# Patient Record
Sex: FEMALE | Race: WHITE | HISPANIC OR LATINO
[De-identification: names, ages, dates, MRNs, and addresses within clinical notes are randomized per-mention and may not be internally consistent; named-entity substitution may affect disease eponyms.]

---

## 2017-06-29 ENCOUNTER — TRANSCRIPTION ENCOUNTER (OUTPATIENT)
Age: 47
End: 2017-06-29

## 2017-09-12 ENCOUNTER — APPOINTMENT (OUTPATIENT)
Dept: HUMAN REPRODUCTION | Facility: CLINIC | Age: 47
End: 2017-09-12
Payer: COMMERCIAL

## 2017-09-12 PROCEDURE — 99215 OFFICE O/P EST HI 40 MIN: CPT

## 2017-09-12 PROCEDURE — 36415 COLL VENOUS BLD VENIPUNCTURE: CPT

## 2017-10-17 ENCOUNTER — APPOINTMENT (OUTPATIENT)
Dept: HUMAN REPRODUCTION | Facility: CLINIC | Age: 47
End: 2017-10-17
Payer: COMMERCIAL

## 2017-10-17 PROCEDURE — 99215 OFFICE O/P EST HI 40 MIN: CPT

## 2017-10-17 PROCEDURE — 36415 COLL VENOUS BLD VENIPUNCTURE: CPT

## 2017-11-14 VITALS — HEIGHT: 63 IN | WEIGHT: 152 LBS | BODY MASS INDEX: 26.93 KG/M2

## 2017-11-14 DIAGNOSIS — Z82.49 FAMILY HISTORY OF ISCHEMIC HEART DISEASE AND OTHER DISEASES OF THE CIRCULATORY SYSTEM: ICD-10-CM

## 2017-11-21 ENCOUNTER — APPOINTMENT (OUTPATIENT)
Dept: ANTEPARTUM | Facility: CLINIC | Age: 47
End: 2017-11-21

## 2017-11-21 ENCOUNTER — APPOINTMENT (OUTPATIENT)
Dept: MATERNAL FETAL MEDICINE | Facility: CLINIC | Age: 47
End: 2017-11-21
Payer: COMMERCIAL

## 2017-11-21 ENCOUNTER — ASOB RESULT (OUTPATIENT)
Age: 47
End: 2017-11-21

## 2017-11-21 VITALS
DIASTOLIC BLOOD PRESSURE: 68 MMHG | BODY MASS INDEX: 27.11 KG/M2 | SYSTOLIC BLOOD PRESSURE: 90 MMHG | WEIGHT: 153 LBS | HEIGHT: 63 IN

## 2017-11-21 VITALS — OXYGEN SATURATION: 99 % | HEART RATE: 76 BPM

## 2017-11-21 LAB
BILIRUB UR QL STRIP: NEGATIVE
COLLECTION METHOD: NORMAL
GLUCOSE UR-MCNC: NEGATIVE
HCG UR QL: 0.2 EU/DL
HGB UR QL STRIP.AUTO: NEGATIVE
KETONES UR-MCNC: NEGATIVE
LEUKOCYTE ESTERASE UR QL STRIP: NORMAL
NITRITE UR QL STRIP: NEGATIVE
PH UR STRIP: 5
PROT UR STRIP-MCNC: NEGATIVE
SP GR UR STRIP: 1.01

## 2017-11-21 PROCEDURE — 99243 OFF/OP CNSLTJ NEW/EST LOW 30: CPT

## 2018-02-15 ENCOUNTER — TRANSCRIPTION ENCOUNTER (OUTPATIENT)
Age: 48
End: 2018-02-15

## 2018-02-17 ENCOUNTER — TRANSCRIPTION ENCOUNTER (OUTPATIENT)
Age: 48
End: 2018-02-17

## 2018-10-23 ENCOUNTER — TRANSCRIPTION ENCOUNTER (OUTPATIENT)
Age: 48
End: 2018-10-23

## 2018-12-18 ENCOUNTER — TRANSCRIPTION ENCOUNTER (OUTPATIENT)
Age: 48
End: 2018-12-18

## 2019-01-17 ENCOUNTER — TRANSCRIPTION ENCOUNTER (OUTPATIENT)
Age: 49
End: 2019-01-17

## 2019-11-27 ENCOUNTER — APPOINTMENT (OUTPATIENT)
Dept: CT IMAGING | Facility: CLINIC | Age: 49
End: 2019-11-27

## 2021-01-22 ENCOUNTER — TRANSCRIPTION ENCOUNTER (OUTPATIENT)
Age: 51
End: 2021-01-22

## 2021-03-29 ENCOUNTER — TRANSCRIPTION ENCOUNTER (OUTPATIENT)
Age: 51
End: 2021-03-29

## 2021-08-06 ENCOUNTER — TRANSCRIPTION ENCOUNTER (OUTPATIENT)
Age: 51
End: 2021-08-06

## 2021-10-22 ENCOUNTER — TRANSCRIPTION ENCOUNTER (OUTPATIENT)
Age: 51
End: 2021-10-22

## 2021-10-29 ENCOUNTER — TRANSCRIPTION ENCOUNTER (OUTPATIENT)
Age: 51
End: 2021-10-29

## 2022-03-13 ENCOUNTER — TRANSCRIPTION ENCOUNTER (OUTPATIENT)
Age: 52
End: 2022-03-13

## 2022-04-10 ENCOUNTER — TRANSCRIPTION ENCOUNTER (OUTPATIENT)
Age: 52
End: 2022-04-10

## 2022-04-17 ENCOUNTER — TRANSCRIPTION ENCOUNTER (OUTPATIENT)
Age: 52
End: 2022-04-17

## 2022-04-21 ENCOUNTER — TRANSCRIPTION ENCOUNTER (OUTPATIENT)
Age: 52
End: 2022-04-21

## 2022-04-27 ENCOUNTER — TRANSCRIPTION ENCOUNTER (OUTPATIENT)
Age: 52
End: 2022-04-27

## 2022-05-07 ENCOUNTER — NON-APPOINTMENT (OUTPATIENT)
Age: 52
End: 2022-05-07

## 2022-07-14 ENCOUNTER — APPOINTMENT (OUTPATIENT)
Dept: GASTROENTEROLOGY | Facility: CLINIC | Age: 52
End: 2022-07-14

## 2022-07-14 VITALS
BODY MASS INDEX: 28.7 KG/M2 | RESPIRATION RATE: 18 BRPM | DIASTOLIC BLOOD PRESSURE: 58 MMHG | OXYGEN SATURATION: 99 % | HEART RATE: 85 BPM | TEMPERATURE: 97.5 F | HEIGHT: 63.5 IN | WEIGHT: 164 LBS | SYSTOLIC BLOOD PRESSURE: 102 MMHG

## 2022-07-14 DIAGNOSIS — Z12.11 ENCOUNTER FOR SCREENING FOR MALIGNANT NEOPLASM OF COLON: ICD-10-CM

## 2022-07-14 DIAGNOSIS — K59.00 CONSTIPATION, UNSPECIFIED: ICD-10-CM

## 2022-07-14 DIAGNOSIS — K21.9 GASTRO-ESOPHAGEAL REFLUX DISEASE W/OUT ESOPHAGITIS: ICD-10-CM

## 2022-07-14 PROCEDURE — 99204 OFFICE O/P NEW MOD 45 MIN: CPT

## 2022-07-14 RX ORDER — POLYETHYLENE GLYCOL 3350 17 G/17G
17 POWDER, FOR SOLUTION ORAL DAILY
Qty: 30 | Refills: 3 | Status: ACTIVE | COMMUNITY
Start: 2022-07-14 | End: 1900-01-01

## 2022-07-14 NOTE — HISTORY OF PRESENT ILLNESS
[FreeTextEntry1] : 52 yo female with c/o abdominal pain luq  and epigastric pain ,now resolved with bloating, labs done by pcp pr patient normal, patient on probiotics improved .  ct scan recommended not done.Sill with c/o bloating, irregular bms, patietn reports 4  or 5bms a week hard stool. improved with probiotics. no brbpr, no melena.no weight loss.\par \par patient undergoing IVF\par no h/o colon or gastric cancer\par h/o gallstones\par \par h/o egd over 10 years\par no h/o colonoscopy

## 2022-07-14 NOTE — REVIEW OF SYSTEMS
[As Noted in HPI] : as noted in HPI [Fever] : no fever [Chills] : no chills [Eyesight Problems] : no eyesight problems [Discharge From Eyes] : no purulent discharge from the eyes [Nosebleeds] : no nosebleeds [Nasal Discharge] : no nasal discharge [Chest Pain] : no chest pain [Cough] : no cough [SOB on Exertion] : no shortness of breath during exertion [Pelvic Pain] : no pelvic pain [Dysmenorrhea] : no dysmenorrhea [Itching] : no itching [Change In A Mole] : no change in a mole [Dizziness] : no dizziness [Fainting] : no fainting [Anxiety] : no anxiety [Depression] : no depression [Muscle Weakness] : no muscle weakness [Swollen Glands] : no swollen glands [Swollen Glands In The Neck] : no swollen glands in the neck

## 2022-07-14 NOTE — PHYSICAL EXAM
[General Appearance - Alert] : alert [General Appearance - In No Acute Distress] : in no acute distress [Sclera] : the sclera and conjunctiva were normal [Hearing Threshold Finger Rub Not [place]] : hearing was normal [] : no respiratory distress [Auscultation Breath Sounds / Voice Sounds] : lungs were clear to auscultation bilaterally [Heart Sounds] : normal S1 and S2 [Bowel Sounds] : normal bowel sounds [Abdomen Soft] : soft [Abdomen Tenderness] : non-tender [No CVA Tenderness] : no ~M costovertebral angle tenderness [Abnormal Walk] : normal gait [No Focal Deficits] : no focal deficits [Oriented To Time, Place, And Person] : oriented to person, place, and time

## 2022-07-14 NOTE — ASSESSMENT
[FreeTextEntry1] : 1. midepigastric pain, now resolved, ddx gerd,gastriitis, pud, biliary disease\par \par plan acid suppression prn\par        egd to evaluate\par    abdominal us r/o gallstones\par     labs requested ,pt to bring in\par \par 2. chronic constipation\par \par plan miralax daily\par \par 3. average risk for colon cancer recommend colonoscopy for screening\par \par Discussed risks including but not limited to bleeding,infection,drug reaction, perforation,missed lesion,benefits and alternatives of colonoscopy/egd with patient  including no\par treatment and patient consents to procedure.\par \par plan colonoscopy to be scheduled.\par \par

## 2022-07-16 ENCOUNTER — NON-APPOINTMENT (OUTPATIENT)
Age: 52
End: 2022-07-16

## 2022-07-22 DIAGNOSIS — Z00.00 ENCOUNTER FOR GENERAL ADULT MEDICAL EXAMINATION W/OUT ABNORMAL FINDINGS: ICD-10-CM

## 2022-08-06 ENCOUNTER — OUTPATIENT (OUTPATIENT)
Dept: OUTPATIENT SERVICES | Facility: HOSPITAL | Age: 52
LOS: 1 days | End: 2022-08-06
Payer: COMMERCIAL

## 2022-08-06 ENCOUNTER — APPOINTMENT (OUTPATIENT)
Dept: ULTRASOUND IMAGING | Facility: CLINIC | Age: 52
End: 2022-08-06

## 2022-08-06 DIAGNOSIS — Z00.8 ENCOUNTER FOR OTHER GENERAL EXAMINATION: ICD-10-CM

## 2022-08-06 DIAGNOSIS — R10.9 UNSPECIFIED ABDOMINAL PAIN: ICD-10-CM

## 2022-08-06 PROCEDURE — 76700 US EXAM ABDOM COMPLETE: CPT | Mod: 26

## 2022-08-06 PROCEDURE — 76700 US EXAM ABDOM COMPLETE: CPT

## 2022-08-11 ENCOUNTER — NON-APPOINTMENT (OUTPATIENT)
Age: 52
End: 2022-08-11

## 2022-08-16 ENCOUNTER — NON-APPOINTMENT (OUTPATIENT)
Age: 52
End: 2022-08-16

## 2022-08-16 LAB — SARS-COV-2 N GENE NPH QL NAA+PROBE: NOT DETECTED

## 2022-08-19 ENCOUNTER — APPOINTMENT (OUTPATIENT)
Dept: GASTROENTEROLOGY | Facility: CLINIC | Age: 52
End: 2022-08-19

## 2022-08-19 ENCOUNTER — LABORATORY RESULT (OUTPATIENT)
Age: 52
End: 2022-08-19

## 2022-08-19 PROCEDURE — 43239 EGD BIOPSY SINGLE/MULTIPLE: CPT

## 2022-08-19 PROCEDURE — 45378 DIAGNOSTIC COLONOSCOPY: CPT | Mod: 33

## 2022-08-30 RX ORDER — AMOXICILLIN 500 MG/1
500 TABLET, FILM COATED ORAL
Qty: 56 | Refills: 0 | Status: ACTIVE | COMMUNITY
Start: 2022-08-30 | End: 1900-01-01

## 2022-08-30 RX ORDER — CLARITHROMYCIN 500 MG/1
500 TABLET, FILM COATED ORAL
Qty: 28 | Refills: 0 | Status: ACTIVE | COMMUNITY
Start: 2022-08-30 | End: 1900-01-01

## 2022-08-30 RX ORDER — OMEPRAZOLE 20 MG/1
20 CAPSULE, DELAYED RELEASE ORAL TWICE DAILY
Qty: 28 | Refills: 2 | Status: ACTIVE | COMMUNITY
Start: 2022-08-30 | End: 1900-01-01

## 2022-09-22 ENCOUNTER — APPOINTMENT (OUTPATIENT)
Dept: ORTHOPEDIC SURGERY | Facility: CLINIC | Age: 52
End: 2022-09-22

## 2022-09-22 VITALS — BODY MASS INDEX: 29.06 KG/M2 | HEIGHT: 63 IN | WEIGHT: 164 LBS

## 2022-09-22 PROCEDURE — 99204 OFFICE O/P NEW MOD 45 MIN: CPT

## 2022-09-22 PROCEDURE — 99214 OFFICE O/P EST MOD 30 MIN: CPT

## 2022-09-28 NOTE — HISTORY OF PRESENT ILLNESS
[Neck] : neck [Lower back] : lower back [10] : 10 [8] : 8 [Dull/Aching] : dull/aching [Radiating] : radiating [Tightness] : tightness [Constant] : constant [Nothing helps with pain getting better] : Nothing helps with pain getting better [Sitting] : sitting [Standing] : standing [Bending forward] : bending forward [Full time] : Work status: full time [de-identified] : Pt. is a 51 year old female who presents for evaluation of her cervical and lumbar spine. She reports a long standing\par history of cervical and lumbar spine issues. PT in the past (over a year ago) has been very beneficial. Tylenol and Advil\par without relief. She has tried topical medication without relief. There is intermittent numbness/tingling in toes of both\par feet. No issues controlling bowel/bladder. Pain has been waking her at night. [] : no [FreeTextEntry7] : neck / left arm/ left leg

## 2022-09-28 NOTE — ASSESSMENT
[FreeTextEntry1] : Pt. responded very well to a course of PT in the past with virtual resolution in the past. She has been out of therapy\par for over a year and symptoms have returned. She has tried a home exercise program which is not offering the same\par relief. Discussed a return to therapy and patient eager to restart, rx provided. Discussed can always get advanced imaging if no improvement with PT.\par fu if no improvement with PT

## 2022-09-30 ENCOUNTER — NON-APPOINTMENT (OUTPATIENT)
Age: 52
End: 2022-09-30

## 2023-02-15 ENCOUNTER — APPOINTMENT (OUTPATIENT)
Dept: ORTHOPEDIC SURGERY | Facility: CLINIC | Age: 53
End: 2023-02-15

## 2023-02-18 ENCOUNTER — NON-APPOINTMENT (OUTPATIENT)
Age: 53
End: 2023-02-18

## 2023-02-21 ENCOUNTER — APPOINTMENT (OUTPATIENT)
Dept: ORTHOPEDIC SURGERY | Facility: CLINIC | Age: 53
End: 2023-02-21
Payer: COMMERCIAL

## 2023-02-21 VITALS — HEIGHT: 63 IN | BODY MASS INDEX: 29.06 KG/M2 | WEIGHT: 164 LBS

## 2023-02-21 PROCEDURE — 99213 OFFICE O/P EST LOW 20 MIN: CPT

## 2023-02-23 NOTE — IMAGING
[de-identified] : motor 5/5\par sensation I LT\par - SLR\par lumbar no spasm\par lumbar nontender\par \par c spine: nontender, no spasm\par no deformity\par motor UE 5/5\par  sensation I LT\par - [place]

## 2023-02-23 NOTE — ASSESSMENT
[FreeTextEntry1] : has responded well to PT and acupuncture in the past;  wishes to continue along those lines;  no new symptoms to investigate;  sooner she can transition to a HEP the better;  will consider repeat advanced imaging if she cannot transition to HEP after a few months of PT

## 2023-02-23 NOTE — HISTORY OF PRESENT ILLNESS
[Neck] : neck [Lower back] : lower back [10] : 10 [8] : 8 [Dull/Aching] : dull/aching [Radiating] : radiating [Tightness] : tightness [Constant] : constant [Nothing helps with pain getting better] : Nothing helps with pain getting better [Sitting] : sitting [Standing] : standing [Bending forward] : bending forward [Full time] : Work status: full time [de-identified] : less pain with PT and HEP;  no new symptoms; \par acupuncture also continues to help mitigate symptoms; [] : no [FreeTextEntry5] : Pt here for follow up of neck and low back pain. Around 02/02/2023; started having severe neck and left arm pain; elbow was throbbing. Attended PT & acupuncture for neck- helped greatly. PT has been helping low back. Needs new PT script and acupuncture script today.  [FreeTextEntry7] : neck / left arm/ left leg

## 2023-04-03 ENCOUNTER — APPOINTMENT (OUTPATIENT)
Dept: ORTHOPEDIC SURGERY | Facility: CLINIC | Age: 53
End: 2023-04-03

## 2023-04-12 ENCOUNTER — NON-APPOINTMENT (OUTPATIENT)
Age: 53
End: 2023-04-12

## 2023-04-17 ENCOUNTER — APPOINTMENT (OUTPATIENT)
Dept: GASTROENTEROLOGY | Facility: CLINIC | Age: 53
End: 2023-04-17

## 2023-05-01 ENCOUNTER — APPOINTMENT (OUTPATIENT)
Dept: ORTHOPEDIC SURGERY | Facility: CLINIC | Age: 53
End: 2023-05-01
Payer: COMMERCIAL

## 2023-05-01 VITALS — BODY MASS INDEX: 29.06 KG/M2 | WEIGHT: 164 LBS | HEIGHT: 63 IN

## 2023-05-01 DIAGNOSIS — M77.12 LATERAL EPICONDYLITIS, LEFT ELBOW: ICD-10-CM

## 2023-05-01 PROCEDURE — 99213 OFFICE O/P EST LOW 20 MIN: CPT

## 2023-05-01 NOTE — HISTORY OF PRESENT ILLNESS
[Neck] : neck [Lower back] : lower back [8] : 8 [Dull/Aching] : dull/aching [Radiating] : radiating [Tightness] : tightness [Constant] : constant [Nothing helps with pain getting better] : Nothing helps with pain getting better [Sitting] : sitting [Standing] : standing [Bending forward] : bending forward [Full time] : Work status: full time [de-identified] : 5/1/23: Here to f/up with c&l. Has been attending PT which has improved pain, still remains some radicular components, shooting pain through arm.  [] : no [FreeTextEntry5] : Pt here for follow up of neck and low back pain. Doing well with PT & acupuncture. Neck feels worse today.  Left leg pain has subsided.  Needs new PT script and acupuncture script today.  [FreeTextEntry7] :  left arm

## 2023-05-25 ENCOUNTER — LABORATORY RESULT (OUTPATIENT)
Age: 53
End: 2023-05-25

## 2023-05-25 ENCOUNTER — APPOINTMENT (OUTPATIENT)
Dept: GASTROENTEROLOGY | Facility: CLINIC | Age: 53
End: 2023-05-25
Payer: COMMERCIAL

## 2023-05-25 VITALS
HEIGHT: 63 IN | BODY MASS INDEX: 30.48 KG/M2 | SYSTOLIC BLOOD PRESSURE: 106 MMHG | OXYGEN SATURATION: 99 % | HEART RATE: 75 BPM | WEIGHT: 172 LBS | DIASTOLIC BLOOD PRESSURE: 70 MMHG

## 2023-05-25 PROCEDURE — 99215 OFFICE O/P EST HI 40 MIN: CPT

## 2023-05-25 NOTE — PHYSICAL EXAM
[None] : no edema [Normal] : normal bowel sounds, non-tender, no masses, soft, no no hepato-splenomegaly [Bowel Sounds] : normal bowel sounds [Abdomen Tenderness] : non-tender [No CVA Tenderness] : no CVA  tenderness [Abnormal Walk] : normal gait [] : no rash [No Focal Deficits] : no focal deficits [Oriented To Time, Place, And Person] : oriented to person, place, and time

## 2023-05-25 NOTE — REVIEW OF SYSTEMS
[As Noted in HPI] : as noted in HPI [Fever] : no fever [Red Eyes] : eyes not red [Sore Throat] : no sore throat [Chest Pain] : no chest pain [Testicular Pain] : no testicular pain [Rash] : no rash [Skin Wound] : no skin wound [Dizziness] : no dizziness [Fainting] : no fainting [Depression] : no depression [Muscle Weakness] : no muscle weakness [Easy Bruising] : no tendency for easy bruising

## 2023-05-25 NOTE — ASSESSMENT
[FreeTextEntry1] : 1. epigastric pain worse with certain food, probable gerd related\par \par plan;ppi as needed\par \par 2. fatty liver on us\par \par plan fibroscan\par weight management referral\par \par 3. h/o h.pylori gastritis\par \par plan h.pylori breath test\par \par 4gas/bloating\par \par plan low fodmap diet\par probiotics

## 2023-05-25 NOTE — HISTORY OF PRESENT ILLNESS
[FreeTextEntry1] : 53 yo female withc/o epigastric pain, chronic since last year, abdominal us, fatty liver. patient takes mylanta as needed. food triggers pain, not weekly. weight gain, no n/v. no gerd.  recent constipation, no brbpr,\par \par s/p egd/colonoscopy mild gastritis, h.pylori positive 2022

## 2023-05-31 LAB
ENDOMYSIUM IGA SER QL: NEGATIVE
ENDOMYSIUM IGA TITR SER: NORMAL
GLIADIN IGA SER QL: 6.4 UNITS
GLIADIN IGG SER QL: <5 UNITS
GLIADIN PEPTIDE IGA SER-ACNC: NEGATIVE
GLIADIN PEPTIDE IGG SER-ACNC: NEGATIVE
IGA SER QL IEP: 197 MG/DL
TTG IGA SER IA-ACNC: 2.4 U/ML
TTG IGA SER-ACNC: NEGATIVE
TTG IGG SER IA-ACNC: <1.2 U/ML
TTG IGG SER IA-ACNC: NEGATIVE
UREA BREATH TEST QL: POSITIVE

## 2023-05-31 RX ORDER — OMEPRAZOLE 20 MG/1
20 CAPSULE, DELAYED RELEASE ORAL TWICE DAILY
Qty: 20 | Refills: 0 | Status: ACTIVE | COMMUNITY
Start: 2023-05-31 | End: 1900-01-01

## 2023-05-31 RX ORDER — TETRACYCLINE HYDROCHLORIDE 500 MG/1
500 CAPSULE ORAL EVERY 6 HOURS
Qty: 40 | Refills: 0 | Status: ACTIVE | COMMUNITY
Start: 2023-05-31 | End: 1900-01-01

## 2023-05-31 RX ORDER — METRONIDAZOLE 250 MG/1
250 TABLET ORAL EVERY 6 HOURS
Qty: 40 | Refills: 0 | Status: ACTIVE | COMMUNITY
Start: 2023-05-31 | End: 1900-01-01

## 2023-05-31 RX ORDER — BISMUTH SUBSALICYLATE 262 MG/1
262 TABLET, CHEWABLE ORAL 4 TIMES DAILY
Qty: 80 | Refills: 0 | Status: ACTIVE | COMMUNITY
Start: 2023-05-31 | End: 1900-01-01

## 2023-06-05 ENCOUNTER — APPOINTMENT (OUTPATIENT)
Dept: ORTHOPEDIC SURGERY | Facility: CLINIC | Age: 53
End: 2023-06-05
Payer: COMMERCIAL

## 2023-06-05 VITALS — HEIGHT: 63 IN | BODY MASS INDEX: 30.48 KG/M2 | WEIGHT: 172 LBS

## 2023-06-05 DIAGNOSIS — M54.12 RADICULOPATHY, CERVICAL REGION: ICD-10-CM

## 2023-06-05 DIAGNOSIS — M54.16 RADICULOPATHY, LUMBAR REGION: ICD-10-CM

## 2023-06-05 PROCEDURE — 99213 OFFICE O/P EST LOW 20 MIN: CPT

## 2023-06-05 NOTE — ASSESSMENT
[FreeTextEntry1] : Would benefit from the continuation of PT and acupuncture as it seems she has setbacks when she is not attending. NSAIDs as needed. Can consider MRI if symptoms worsen or progress. f/u 6 weeks \par \par Patient seen by "Susan ROSE" under the supervision of "Dr. Cleaning"\par \par

## 2023-06-05 NOTE — HISTORY OF PRESENT ILLNESS
[Neck] : neck [Lower back] : lower back [8] : 8 [Dull/Aching] : dull/aching [Radiating] : radiating [Tightness] : tightness [Constant] : constant [Nothing helps with pain getting better] : Nothing helps with pain getting better [Sitting] : sitting [Standing] : standing [Bending forward] : bending forward [Full time] : Work status: full time [de-identified] : Here for fu on the neck and the back; notes worsening radicular complains into the left arm and left leg when she it not in PT and acupuncture. No new changes in weakness or bladder/bowel incontinence. She takes advil as needed.  [] : no [FreeTextEntry5] : Pt here for follow up of neck and low back pain. PT has been helping; didn't attend in 1 week; left leg became weak. Needs new PT script. [FreeTextEntry7] :  left arm

## 2023-07-10 ENCOUNTER — APPOINTMENT (OUTPATIENT)
Dept: HEPATOLOGY | Facility: CLINIC | Age: 53
End: 2023-07-10
Payer: COMMERCIAL

## 2023-07-10 DIAGNOSIS — K29.70 GASTRITIS, UNSPECIFIED, W/OUT BLEEDING: ICD-10-CM

## 2023-07-10 DIAGNOSIS — R10.9 UNSPECIFIED ABDOMINAL PAIN: ICD-10-CM

## 2023-07-10 DIAGNOSIS — K76.0 FATTY (CHANGE OF) LIVER, NOT ELSEWHERE CLASSIFIED: ICD-10-CM

## 2023-07-10 DIAGNOSIS — B96.81 GASTRITIS, UNSPECIFIED, W/OUT BLEEDING: ICD-10-CM

## 2023-07-10 PROCEDURE — 76981 USE PARENCHYMA: CPT

## 2023-07-14 PROBLEM — K76.0 FATTY LIVER: Status: ACTIVE | Noted: 2023-05-25

## 2023-07-14 PROBLEM — K29.70 GASTRITIS, HELICOBACTER PYLORI: Status: ACTIVE | Noted: 2022-08-26

## 2023-07-14 PROBLEM — R10.9 ABDOMINAL PAIN: Status: ACTIVE | Noted: 2022-07-14

## 2023-10-23 ENCOUNTER — APPOINTMENT (OUTPATIENT)
Dept: OBGYN | Facility: CLINIC | Age: 53
End: 2023-10-23
Payer: COMMERCIAL

## 2023-10-23 PROCEDURE — 99396 PREV VISIT EST AGE 40-64: CPT

## 2023-11-02 ENCOUNTER — APPOINTMENT (OUTPATIENT)
Dept: OBGYN | Facility: CLINIC | Age: 53
End: 2023-11-02
Payer: COMMERCIAL

## 2023-11-02 PROCEDURE — 76830 TRANSVAGINAL US NON-OB: CPT

## 2023-12-10 ENCOUNTER — NON-APPOINTMENT (OUTPATIENT)
Age: 53
End: 2023-12-10

## 2024-02-12 ENCOUNTER — APPOINTMENT (OUTPATIENT)
Dept: ORTHOPEDIC SURGERY | Facility: CLINIC | Age: 54
End: 2024-02-12
Payer: COMMERCIAL

## 2024-02-12 DIAGNOSIS — M54.2 CERVICALGIA: ICD-10-CM

## 2024-02-12 PROCEDURE — 99213 OFFICE O/P EST LOW 20 MIN: CPT

## 2024-02-12 NOTE — HISTORY OF PRESENT ILLNESS
[Neck] : neck [Lower back] : lower back [8] : 8 [Dull/Aching] : dull/aching [Radiating] : radiating [Tightness] : tightness [Constant] : constant [Nothing helps with pain getting better] : Nothing helps with pain getting better [Sitting] : sitting [Standing] : standing [Bending forward] : bending forward [Full time] : Work status: full time [de-identified] : Here for fu on the neck and the back; notes worsening radicular complains into the LUE with n/t and headaches. Has been doing PT over the last few weeks which helps temporarily.  [] : no [FreeTextEntry7] :  left arm [FreeTextEntry5] : Pt here for follow up of neck and low back pain. PT has been helping; didn't attend in 1 week; left leg became weak. Needs new PT script.

## 2024-02-12 NOTE — IMAGING
[de-identified] : L cervical paraspinal muscle tenderness L cervical paraspinal muscle spasm L trapezius muscle tenderness L trapezius muscle spasm  diminished ROM in all planes  motor exam 5/5 strength bilaterally sensory intact negative hoffmans negative spurlings

## 2024-02-12 NOTE — ASSESSMENT
[FreeTextEntry1] : 52 yo F with hx of cervical HNP p/w recurrent neck and LUE radicular pain, severe. Has been doing PT/HEP and meds without much relief. Has been many years since MRI CS. Will get updated MRI CS at this time. Continue PT. f/u after MRI to review.  Patient seen by Rachael Bangura PA-C under the supervision of Corbin Cleaning MD

## 2024-03-02 ENCOUNTER — APPOINTMENT (OUTPATIENT)
Dept: MRI IMAGING | Facility: CLINIC | Age: 54
End: 2024-03-02
Payer: COMMERCIAL

## 2024-03-02 PROCEDURE — 72141 MRI NECK SPINE W/O DYE: CPT

## 2024-03-11 ENCOUNTER — APPOINTMENT (OUTPATIENT)
Dept: ORTHOPEDIC SURGERY | Facility: CLINIC | Age: 54
End: 2024-03-11

## 2024-04-29 ENCOUNTER — APPOINTMENT (OUTPATIENT)
Dept: INTERNAL MEDICINE | Facility: CLINIC | Age: 54
End: 2024-04-29

## 2024-05-06 ENCOUNTER — APPOINTMENT (OUTPATIENT)
Dept: OBGYN | Facility: CLINIC | Age: 54
End: 2024-05-06

## 2024-06-24 ENCOUNTER — APPOINTMENT (OUTPATIENT)
Dept: OBGYN | Facility: CLINIC | Age: 54
End: 2024-06-24
Payer: COMMERCIAL

## 2024-06-24 PROCEDURE — 36415 COLL VENOUS BLD VENIPUNCTURE: CPT

## 2024-06-24 PROCEDURE — 99213 OFFICE O/P EST LOW 20 MIN: CPT

## 2024-07-25 ENCOUNTER — APPOINTMENT (OUTPATIENT)
Dept: ORTHOPEDIC SURGERY | Facility: CLINIC | Age: 54
End: 2024-07-25

## 2024-09-05 ENCOUNTER — APPOINTMENT (OUTPATIENT)
Dept: ORTHOPEDIC SURGERY | Facility: CLINIC | Age: 54
End: 2024-09-05

## 2024-09-09 ENCOUNTER — APPOINTMENT (OUTPATIENT)
Dept: GASTROENTEROLOGY | Facility: CLINIC | Age: 54
End: 2024-09-09
Payer: COMMERCIAL

## 2024-09-09 VITALS
WEIGHT: 174 LBS | HEART RATE: 88 BPM | OXYGEN SATURATION: 98 % | HEIGHT: 63 IN | SYSTOLIC BLOOD PRESSURE: 137 MMHG | DIASTOLIC BLOOD PRESSURE: 83 MMHG | BODY MASS INDEX: 30.83 KG/M2

## 2024-09-09 DIAGNOSIS — R14.0 ABDOMINAL DISTENSION (GASEOUS): ICD-10-CM

## 2024-09-09 PROCEDURE — 99214 OFFICE O/P EST MOD 30 MIN: CPT

## 2024-09-09 RX ORDER — OMEPRAZOLE 40 MG/1
40 CAPSULE, DELAYED RELEASE ORAL
Qty: 30 | Refills: 5 | Status: ACTIVE | COMMUNITY
Start: 2024-09-09 | End: 1900-01-01

## 2024-09-09 RX ORDER — LINACLOTIDE 145 UG/1
145 CAPSULE, GELATIN COATED ORAL
Qty: 30 | Refills: 3 | Status: ACTIVE | COMMUNITY
Start: 2024-09-09 | End: 1900-01-01

## 2024-09-09 NOTE — REVIEW OF SYSTEMS
[As Noted in HPI] : as noted in HPI [Fever] : no fever [Chills] : no chills [Red Eyes] : eyes not red [Sore Throat] : no sore throat [Chest Pain] : no chest pain [Palpitations] : no palpitations [SOB on Exertion] : no shortness of breath during exertion [Rash] : no rash [Joint Stiffness] : no joint stiffness [Skin Wound] : no skin wound [Fainting] : no fainting [Anxiety] : no anxiety [Muscle Weakness] : no muscle weakness [Easy Bruising] : no tendency for easy bruising

## 2024-09-09 NOTE — PHYSICAL EXAM
[Alert] : alert [Healthy Appearing] : healthy appearing [Sclera] : the sclera and conjunctiva were normal [Normal Appearance] : the appearance of the neck was normal [No Respiratory Distress] : no respiratory distress [Auscultation Breath Sounds / Voice Sounds] : lungs were clear to auscultation bilaterally [Normal S1, S2] : normal S1 and S2 [Bowel Sounds] : normal bowel sounds [Abdomen Tenderness] : non-tender [Abdomen Soft] : soft [No CVA Tenderness] : no CVA  tenderness [Abnormal Walk] : normal gait [No Clubbing, Cyanosis] : no clubbing or cyanosis of the fingernails [] : no rash [No Focal Deficits] : no focal deficits [Oriented To Time, Place, And Person] : oriented to person, place, and time [de-identified] : mild epigastric tenderness

## 2024-09-09 NOTE — HISTORY OF PRESENT ILLNESS
[FreeTextEntry1] : 53 yo female with h/o fatty liver and patient wtih c/o abdominal bloating, postive h.pylori 2022. treated with antibiotics. patient takes ppi as needed. helps the GERD and bloating. normal bms, no brbpr, no melena  s/p egd/colonoscopy in 2022 hpylori gastritis,  repeat colonoscopy in 5 years

## 2024-09-09 NOTE — ASSESSMENT
[FreeTextEntry1] : 1. bloating , constipation, gerd  plan low FODMAP diet linzess daily abdominal us  2. fatty liver  plan nutritionist weight management diet and exercise for weight loss patient needs CPE with labs monitor lfts every 6 months 3.h/o h.pylori  plan h.pylori breath test today

## 2024-09-13 LAB — UREA BREATH TEST QL: POSITIVE

## 2024-09-13 RX ORDER — OMEPRAZOLE MAGNESIUM, AMOXICILLIN AND RIFABUTIN 10; 250; 12.5 MG/1; MG/1; MG/1
250-12.5-1 CAPSULE, DELAYED RELEASE ORAL
Qty: 168 | Refills: 0 | Status: ACTIVE | COMMUNITY
Start: 2024-09-13 | End: 1900-01-01

## 2024-09-30 ENCOUNTER — APPOINTMENT (OUTPATIENT)
Dept: ORTHOPEDIC SURGERY | Facility: CLINIC | Age: 54
End: 2024-09-30
Payer: COMMERCIAL

## 2024-09-30 DIAGNOSIS — J45.909 UNSPECIFIED ASTHMA, UNCOMPLICATED: ICD-10-CM

## 2024-09-30 DIAGNOSIS — M54.16 RADICULOPATHY, LUMBAR REGION: ICD-10-CM

## 2024-09-30 DIAGNOSIS — M54.12 RADICULOPATHY, CERVICAL REGION: ICD-10-CM

## 2024-09-30 PROCEDURE — 99213 OFFICE O/P EST LOW 20 MIN: CPT | Mod: 25

## 2024-09-30 PROCEDURE — 72170 X-RAY EXAM OF PELVIS: CPT

## 2024-09-30 PROCEDURE — 72110 X-RAY EXAM L-2 SPINE 4/>VWS: CPT

## 2024-10-01 NOTE — DATA REVIEWED
[MRI] : MRI [Cervical Spine] : cervical spine [Report was reviewed and noted in the chart] : The report was reviewed and noted in the chart [I independently reviewed and interpreted images and report] : I independently reviewed and interpreted images and report [I reviewed the films/CD and agree] : I reviewed the films/CD and agree [FreeTextEntry1] : L C4-5 osteophyte with foraminal stenosis

## 2024-10-01 NOTE — HISTORY OF PRESENT ILLNESS
[Neck] : neck [Lower back] : lower back [8] : 8 [Dull/Aching] : dull/aching [Radiating] : radiating [Tightness] : tightness [Constant] : constant [Nothing helps with pain getting better] : Nothing helps with pain getting better [Sitting] : sitting [Standing] : standing [Bending forward] : bending forward [Full time] : Work status: full time [de-identified] : Here for fu on the neck and the back; notes worsening radicular complains into the LLE and severe mid to low and persistent posterior/lateral leg to anterior ankle x 2 month. has been doing HEP and acupuncture without much relief. Feels she can't stand totally straight. no bb dysfunction.   has been doing PT for the neck and L shoulder - had to stop which flares up the pain. not going all the way down the arm anymore but still in the L shoulder/chest and periscap region.  [] : no [FreeTextEntry5] : Pt here for follow up of neck and low back pain. PT has been helping; didn't attend in 1 week; left leg became weak. Needs new PT script. [FreeTextEntry7] :  left arm

## 2024-10-01 NOTE — IMAGING
[de-identified] : L cervical paraspinal muscle tenderness L cervical paraspinal muscle spasm L trapezius muscle tenderness L trapezius muscle spasm  diminished ROM in all planes  motor exam 5/5 strength bilaterally sensory intact negative hoffmans negative spurlings  midline lumbar tenderness   pain with flexion pain with extension diminished ROM in all planes   motor exam 5/5 strength bilaterally sensory intact +L SLR L L4/5 dermatome of pain   ambulates without assistive device non antalgic gait able to heel/toe walk

## 2024-10-01 NOTE — ASSESSMENT
[FreeTextEntry1] : 54 yo F with hx of cervical HNP p/w persistentt neck and LUE radicular pain. MRI CS shows L osteophyte complex at C4-5 with foraminal stenosis. Has been doing PT which has been most helpful. We discussed role of BONNIE but she defers for now.   2 months of mid to low back pain and LLE radicular pain. Lumbar XRs show retrolisthesis at L2-3, stable since last XR in 2020. Hx of HNPs and annular tear. Will reinitiate PT and referral to obtain MRI LS given persistent LLE radicular complaints despite HEP, meds and acupuncture. f/u after MRI to review.  family hx of fibromyalgia. consider rheum referral.   dexa scan pending to eval OP.

## 2024-10-02 ENCOUNTER — OUTPATIENT (OUTPATIENT)
Dept: OUTPATIENT SERVICES | Facility: HOSPITAL | Age: 54
LOS: 1 days | End: 2024-10-02
Payer: COMMERCIAL

## 2024-10-02 ENCOUNTER — APPOINTMENT (OUTPATIENT)
Dept: RADIOLOGY | Facility: CLINIC | Age: 54
End: 2024-10-02

## 2024-10-02 ENCOUNTER — APPOINTMENT (OUTPATIENT)
Dept: OBGYN | Facility: CLINIC | Age: 54
End: 2024-10-02
Payer: COMMERCIAL

## 2024-10-02 ENCOUNTER — TRANSCRIPTION ENCOUNTER (OUTPATIENT)
Age: 54
End: 2024-10-02

## 2024-10-02 DIAGNOSIS — Z00.00 ENCOUNTER FOR GENERAL ADULT MEDICAL EXAMINATION WITHOUT ABNORMAL FINDINGS: ICD-10-CM

## 2024-10-02 PROCEDURE — 99459 PELVIC EXAMINATION: CPT

## 2024-10-02 PROCEDURE — 77080 DXA BONE DENSITY AXIAL: CPT | Mod: 26

## 2024-10-02 PROCEDURE — 96127 BRIEF EMOTIONAL/BEHAV ASSMT: CPT

## 2024-10-02 PROCEDURE — 99396 PREV VISIT EST AGE 40-64: CPT

## 2024-10-02 PROCEDURE — 77080 DXA BONE DENSITY AXIAL: CPT

## 2024-10-10 ENCOUNTER — APPOINTMENT (OUTPATIENT)
Dept: MRI IMAGING | Facility: CLINIC | Age: 54
End: 2024-10-10

## 2024-10-10 PROCEDURE — 72148 MRI LUMBAR SPINE W/O DYE: CPT

## 2024-10-11 ENCOUNTER — APPOINTMENT (OUTPATIENT)
Dept: ULTRASOUND IMAGING | Facility: CLINIC | Age: 54
End: 2024-10-11
Payer: COMMERCIAL

## 2024-10-11 ENCOUNTER — OUTPATIENT (OUTPATIENT)
Dept: OUTPATIENT SERVICES | Facility: HOSPITAL | Age: 54
LOS: 1 days | End: 2024-10-11
Payer: COMMERCIAL

## 2024-10-11 DIAGNOSIS — R14.0 ABDOMINAL DISTENSION (GASEOUS): ICD-10-CM

## 2024-10-11 PROCEDURE — 76700 US EXAM ABDOM COMPLETE: CPT

## 2024-10-11 PROCEDURE — 76700 US EXAM ABDOM COMPLETE: CPT | Mod: 26

## 2024-10-17 ENCOUNTER — NON-APPOINTMENT (OUTPATIENT)
Age: 54
End: 2024-10-17

## 2024-10-28 ENCOUNTER — APPOINTMENT (OUTPATIENT)
Dept: OBGYN | Facility: CLINIC | Age: 54
End: 2024-10-28

## 2024-10-29 ENCOUNTER — APPOINTMENT (OUTPATIENT)
Dept: ORTHOPEDIC SURGERY | Facility: CLINIC | Age: 54
End: 2024-10-29
Payer: COMMERCIAL

## 2024-10-29 VITALS — HEIGHT: 63 IN | BODY MASS INDEX: 30.48 KG/M2 | WEIGHT: 172 LBS

## 2024-10-29 DIAGNOSIS — M72.2 PLANTAR FASCIAL FIBROMATOSIS: ICD-10-CM

## 2024-10-29 DIAGNOSIS — M77.50 OTHER ENTHESOPATHY OF UNSPCFD FOOT AND ANKLE: ICD-10-CM

## 2024-10-29 PROCEDURE — 99213 OFFICE O/P EST LOW 20 MIN: CPT

## 2024-10-29 PROCEDURE — 99203 OFFICE O/P NEW LOW 30 MIN: CPT

## 2024-10-29 PROCEDURE — 73610 X-RAY EXAM OF ANKLE: CPT | Mod: LT

## 2024-10-29 PROCEDURE — 73630 X-RAY EXAM OF FOOT: CPT | Mod: LT

## 2024-11-04 VITALS — WEIGHT: 172 LBS | BODY MASS INDEX: 30.48 KG/M2 | HEIGHT: 63 IN

## 2024-11-08 ENCOUNTER — NON-APPOINTMENT (OUTPATIENT)
Age: 54
End: 2024-11-08

## 2024-11-11 ENCOUNTER — APPOINTMENT (OUTPATIENT)
Dept: ENDOCRINOLOGY | Facility: CLINIC | Age: 54
End: 2024-11-11
Payer: COMMERCIAL

## 2024-11-11 ENCOUNTER — APPOINTMENT (OUTPATIENT)
Dept: INTERNAL MEDICINE | Facility: CLINIC | Age: 54
End: 2024-11-11

## 2024-11-11 VITALS
HEIGHT: 63 IN | OXYGEN SATURATION: 100 % | TEMPERATURE: 98 F | BODY MASS INDEX: 31.18 KG/M2 | HEART RATE: 73 BPM | SYSTOLIC BLOOD PRESSURE: 116 MMHG | WEIGHT: 176 LBS | DIASTOLIC BLOOD PRESSURE: 79 MMHG

## 2024-11-11 DIAGNOSIS — E78.5 HYPERLIPIDEMIA, UNSPECIFIED: ICD-10-CM

## 2024-11-11 DIAGNOSIS — R63.5 ABNORMAL WEIGHT GAIN: ICD-10-CM

## 2024-11-11 DIAGNOSIS — R73.03 PREDIABETES.: ICD-10-CM

## 2024-11-11 DIAGNOSIS — N97.9 FEMALE INFERTILITY, UNSPECIFIED: ICD-10-CM

## 2024-11-11 PROCEDURE — 99204 OFFICE O/P NEW MOD 45 MIN: CPT

## 2024-11-21 ENCOUNTER — APPOINTMENT (OUTPATIENT)
Dept: ORTHOPEDIC SURGERY | Facility: CLINIC | Age: 54
End: 2024-11-21

## 2024-12-19 ENCOUNTER — APPOINTMENT (OUTPATIENT)
Dept: ORTHOPEDIC SURGERY | Facility: CLINIC | Age: 54
End: 2024-12-19

## 2024-12-19 VITALS — BODY MASS INDEX: 30.3 KG/M2 | WEIGHT: 171 LBS | HEIGHT: 63 IN

## 2024-12-19 DIAGNOSIS — M54.12 RADICULOPATHY, CERVICAL REGION: ICD-10-CM

## 2024-12-19 PROCEDURE — 99213 OFFICE O/P EST LOW 20 MIN: CPT

## 2025-01-16 ENCOUNTER — APPOINTMENT (OUTPATIENT)
Dept: ORTHOPEDIC SURGERY | Facility: CLINIC | Age: 55
End: 2025-01-16
Payer: COMMERCIAL

## 2025-01-16 ENCOUNTER — NON-APPOINTMENT (OUTPATIENT)
Age: 55
End: 2025-01-16

## 2025-01-16 VITALS — WEIGHT: 171 LBS | BODY MASS INDEX: 30.3 KG/M2 | HEIGHT: 63 IN

## 2025-01-16 DIAGNOSIS — M54.12 RADICULOPATHY, CERVICAL REGION: ICD-10-CM

## 2025-01-16 PROCEDURE — 99213 OFFICE O/P EST LOW 20 MIN: CPT

## 2025-02-04 ENCOUNTER — APPOINTMENT (OUTPATIENT)
Dept: ORTHOPEDIC SURGERY | Facility: CLINIC | Age: 55
End: 2025-02-04

## 2025-02-14 ENCOUNTER — NON-APPOINTMENT (OUTPATIENT)
Age: 55
End: 2025-02-14

## 2025-02-20 ENCOUNTER — NON-APPOINTMENT (OUTPATIENT)
Age: 55
End: 2025-02-20

## 2025-02-27 ENCOUNTER — APPOINTMENT (OUTPATIENT)
Dept: BARIATRICS/WEIGHT MGMT | Facility: CLINIC | Age: 55
End: 2025-02-27

## 2025-02-27 ENCOUNTER — OUTPATIENT (OUTPATIENT)
Dept: OUTPATIENT SERVICES | Facility: HOSPITAL | Age: 55
LOS: 1 days | End: 2025-02-27

## 2025-02-27 VITALS — WEIGHT: 171 LBS | BODY MASS INDEX: 30.3 KG/M2 | HEIGHT: 63 IN

## 2025-02-27 DIAGNOSIS — E66.811 OBESITY, CLASS 1: ICD-10-CM

## 2025-02-27 DIAGNOSIS — E66.09 OBESITY, CLASS 1: ICD-10-CM

## 2025-02-27 DIAGNOSIS — K21.9 GASTRO-ESOPHAGEAL REFLUX DISEASE W/OUT ESOPHAGITIS: ICD-10-CM

## 2025-02-27 DIAGNOSIS — R73.03 PREDIABETES.: ICD-10-CM

## 2025-02-27 DIAGNOSIS — E78.00 PURE HYPERCHOLESTEROLEMIA, UNSPECIFIED: ICD-10-CM

## 2025-02-27 DIAGNOSIS — I10 ESSENTIAL (PRIMARY) HYPERTENSION: ICD-10-CM

## 2025-02-27 PROCEDURE — 99205 OFFICE O/P NEW HI 60 MIN: CPT | Mod: 95

## 2025-02-27 RX ORDER — MIDODRINE HYDROCHLORIDE 5 MG/1
5 TABLET ORAL
Refills: 0 | Status: ACTIVE | COMMUNITY
Start: 2025-02-27

## 2025-03-20 ENCOUNTER — APPOINTMENT (OUTPATIENT)
Dept: ENDOCRINOLOGY | Facility: CLINIC | Age: 55
End: 2025-03-20

## 2025-03-20 VITALS
HEIGHT: 63 IN | BODY MASS INDEX: 30.65 KG/M2 | DIASTOLIC BLOOD PRESSURE: 70 MMHG | OXYGEN SATURATION: 98 % | SYSTOLIC BLOOD PRESSURE: 118 MMHG | WEIGHT: 173 LBS | HEART RATE: 86 BPM

## 2025-03-20 DIAGNOSIS — E66.09 OBESITY, CLASS 1: ICD-10-CM

## 2025-03-20 DIAGNOSIS — E66.811 OBESITY, CLASS 1: ICD-10-CM

## 2025-03-20 DIAGNOSIS — N95.1 MENOPAUSAL AND FEMALE CLIMACTERIC STATES: ICD-10-CM

## 2025-03-20 PROCEDURE — 99214 OFFICE O/P EST MOD 30 MIN: CPT

## 2025-03-30 PROBLEM — N95.1 MENOPAUSAL SYMPTOMS: Status: ACTIVE | Noted: 2025-03-30

## 2025-05-19 ENCOUNTER — APPOINTMENT (OUTPATIENT)
Dept: ORTHOPEDIC SURGERY | Facility: CLINIC | Age: 55
End: 2025-05-19
Payer: COMMERCIAL

## 2025-05-19 DIAGNOSIS — M54.50 LOW BACK PAIN, UNSPECIFIED: ICD-10-CM

## 2025-05-19 DIAGNOSIS — M54.12 RADICULOPATHY, CERVICAL REGION: ICD-10-CM

## 2025-05-19 PROCEDURE — 99213 OFFICE O/P EST LOW 20 MIN: CPT

## 2025-06-30 ENCOUNTER — APPOINTMENT (OUTPATIENT)
Dept: ORTHOPEDIC SURGERY | Facility: CLINIC | Age: 55
End: 2025-06-30

## 2025-06-30 VITALS — WEIGHT: 173 LBS | HEIGHT: 63 IN | BODY MASS INDEX: 30.65 KG/M2

## 2025-06-30 PROBLEM — M25.551 GREATER TROCHANTERIC PAIN SYNDROME OF RIGHT LOWER EXTREMITY: Status: ACTIVE | Noted: 2025-06-30

## 2025-06-30 PROCEDURE — 99214 OFFICE O/P EST MOD 30 MIN: CPT

## 2025-06-30 RX ORDER — MELOXICAM 15 MG/1
15 TABLET ORAL
Qty: 90 | Refills: 3 | Status: ACTIVE | COMMUNITY
Start: 2025-06-30 | End: 1900-01-01

## 2025-06-30 RX ORDER — METHOCARBAMOL 500 MG/1
500 TABLET, FILM COATED ORAL AT BEDTIME
Qty: 21 | Refills: 0 | Status: ACTIVE | COMMUNITY
Start: 2025-06-30 | End: 1900-01-01

## 2025-07-08 ENCOUNTER — APPOINTMENT (OUTPATIENT)
Dept: ORTHOPEDIC SURGERY | Facility: CLINIC | Age: 55
End: 2025-07-08

## 2025-07-14 ENCOUNTER — APPOINTMENT (OUTPATIENT)
Dept: OBGYN | Facility: CLINIC | Age: 55
End: 2025-07-14

## 2025-07-22 ENCOUNTER — APPOINTMENT (OUTPATIENT)
Dept: ORTHOPEDIC SURGERY | Facility: CLINIC | Age: 55
End: 2025-07-22

## 2025-08-05 ENCOUNTER — APPOINTMENT (OUTPATIENT)
Dept: ORTHOPEDIC SURGERY | Facility: CLINIC | Age: 55
End: 2025-08-05
Payer: COMMERCIAL

## 2025-08-05 VITALS — HEIGHT: 63 IN | BODY MASS INDEX: 30.65 KG/M2 | WEIGHT: 173 LBS

## 2025-08-05 DIAGNOSIS — M25.559 PAIN IN UNSPECIFIED HIP: ICD-10-CM

## 2025-08-05 DIAGNOSIS — M70.61 TROCHANTERIC BURSITIS, RIGHT HIP: ICD-10-CM

## 2025-08-05 PROCEDURE — 73503 X-RAY EXAM HIP UNI 4/> VIEWS: CPT | Mod: RT

## 2025-08-05 PROCEDURE — 99213 OFFICE O/P EST LOW 20 MIN: CPT

## 2025-08-11 ENCOUNTER — APPOINTMENT (OUTPATIENT)
Dept: ORTHOPEDIC SURGERY | Facility: CLINIC | Age: 55
End: 2025-08-11

## 2025-09-09 ENCOUNTER — APPOINTMENT (OUTPATIENT)
Dept: ORTHOPEDIC SURGERY | Facility: CLINIC | Age: 55
End: 2025-09-09